# Patient Record
Sex: MALE | Race: WHITE | NOT HISPANIC OR LATINO | URBAN - METROPOLITAN AREA
[De-identification: names, ages, dates, MRNs, and addresses within clinical notes are randomized per-mention and may not be internally consistent; named-entity substitution may affect disease eponyms.]

---

## 2018-08-28 ENCOUNTER — EMERGENCY (EMERGENCY)
Facility: HOSPITAL | Age: 53
LOS: 1 days | Discharge: HOME | End: 2018-08-28
Attending: EMERGENCY MEDICINE
Payer: COMMERCIAL

## 2018-08-28 VITALS
TEMPERATURE: 98 F | HEIGHT: 71 IN | RESPIRATION RATE: 20 BRPM | WEIGHT: 225.09 LBS | DIASTOLIC BLOOD PRESSURE: 76 MMHG | SYSTOLIC BLOOD PRESSURE: 124 MMHG | HEART RATE: 102 BPM | OXYGEN SATURATION: 98 %

## 2018-08-28 VITALS
OXYGEN SATURATION: 98 % | TEMPERATURE: 99 F | HEART RATE: 73 BPM | SYSTOLIC BLOOD PRESSURE: 153 MMHG | RESPIRATION RATE: 16 BRPM | DIASTOLIC BLOOD PRESSURE: 87 MMHG

## 2018-08-28 LAB
ALBUMIN SERPL ELPH-MCNC: 4.4 G/DL — SIGNIFICANT CHANGE UP (ref 3.3–5)
ALP SERPL-CCNC: 81 U/L — SIGNIFICANT CHANGE UP (ref 40–120)
ALT FLD-CCNC: 33 U/L — SIGNIFICANT CHANGE UP (ref 10–45)
ANION GAP SERPL CALC-SCNC: 13 MMOL/L — SIGNIFICANT CHANGE UP (ref 5–17)
AST SERPL-CCNC: 23 U/L — SIGNIFICANT CHANGE UP (ref 10–40)
BASOPHILS # BLD AUTO: 0.1 K/UL — SIGNIFICANT CHANGE UP (ref 0–0.2)
BASOPHILS NFR BLD AUTO: 0.8 % — SIGNIFICANT CHANGE UP (ref 0–2)
BILIRUB SERPL-MCNC: 0.3 MG/DL — SIGNIFICANT CHANGE UP (ref 0.2–1.2)
BLOOD GAS SOURCE: SIGNIFICANT CHANGE UP
BUN SERPL-MCNC: 14 MG/DL — SIGNIFICANT CHANGE UP (ref 7–23)
CALCIUM SERPL-MCNC: 9.7 MG/DL — SIGNIFICANT CHANGE UP (ref 8.4–10.5)
CHLORIDE SERPL-SCNC: 106 MMOL/L — SIGNIFICANT CHANGE UP (ref 96–108)
CO2 SERPL-SCNC: 21 MMOL/L — LOW (ref 22–31)
COHGB MFR BLDV: 2.9 % — HIGH (ref 0–1.5)
CREAT SERPL-MCNC: 1.17 MG/DL — SIGNIFICANT CHANGE UP (ref 0.5–1.3)
EOSINOPHIL # BLD AUTO: 0.2 K/UL — SIGNIFICANT CHANGE UP (ref 0–0.5)
EOSINOPHIL NFR BLD AUTO: 1.5 % — SIGNIFICANT CHANGE UP (ref 0–6)
GLUCOSE SERPL-MCNC: 111 MG/DL — HIGH (ref 70–99)
HCT VFR BLD CALC: 48.9 % — SIGNIFICANT CHANGE UP (ref 39–50)
HGB BLD CALC-MCNC: 16.9 G/DL — SIGNIFICANT CHANGE UP (ref 13–17)
HGB BLD-MCNC: 16.8 G/DL — SIGNIFICANT CHANGE UP (ref 13–17)
LYMPHOCYTES # BLD AUTO: 19.9 % — SIGNIFICANT CHANGE UP (ref 13–44)
LYMPHOCYTES # BLD AUTO: 2.2 K/UL — SIGNIFICANT CHANGE UP (ref 1–3.3)
MCHC RBC-ENTMCNC: 28.9 PG — SIGNIFICANT CHANGE UP (ref 27–34)
MCHC RBC-ENTMCNC: 34.2 GM/DL — SIGNIFICANT CHANGE UP (ref 32–36)
MCV RBC AUTO: 84.4 FL — SIGNIFICANT CHANGE UP (ref 80–100)
MONOCYTES # BLD AUTO: 1 K/UL — HIGH (ref 0–0.9)
MONOCYTES NFR BLD AUTO: 8.6 % — SIGNIFICANT CHANGE UP (ref 2–14)
NEUTROPHILS # BLD AUTO: 7.8 K/UL — HIGH (ref 1.8–7.4)
NEUTROPHILS NFR BLD AUTO: 69.2 % — SIGNIFICANT CHANGE UP (ref 43–77)
PLATELET # BLD AUTO: 249 K/UL — SIGNIFICANT CHANGE UP (ref 150–400)
POTASSIUM SERPL-MCNC: 3.9 MMOL/L — SIGNIFICANT CHANGE UP (ref 3.5–5.3)
POTASSIUM SERPL-SCNC: 3.9 MMOL/L — SIGNIFICANT CHANGE UP (ref 3.5–5.3)
PROT SERPL-MCNC: 7.6 G/DL — SIGNIFICANT CHANGE UP (ref 6–8.3)
RBC # BLD: 5.8 M/UL — SIGNIFICANT CHANGE UP (ref 4.2–5.8)
RBC # FLD: 11.9 % — SIGNIFICANT CHANGE UP (ref 10.3–14.5)
SODIUM SERPL-SCNC: 140 MMOL/L — SIGNIFICANT CHANGE UP (ref 135–145)
WBC # BLD: 11.3 K/UL — HIGH (ref 3.8–10.5)
WBC # FLD AUTO: 11.3 K/UL — HIGH (ref 3.8–10.5)

## 2018-08-28 PROCEDURE — 82375 ASSAY CARBOXYHB QUANT: CPT

## 2018-08-28 PROCEDURE — 99284 EMERGENCY DEPT VISIT MOD MDM: CPT

## 2018-08-28 PROCEDURE — 73030 X-RAY EXAM OF SHOULDER: CPT | Mod: 26,RT

## 2018-08-28 PROCEDURE — 85027 COMPLETE CBC AUTOMATED: CPT

## 2018-08-28 PROCEDURE — 71046 X-RAY EXAM CHEST 2 VIEWS: CPT | Mod: 26

## 2018-08-28 PROCEDURE — 71046 X-RAY EXAM CHEST 2 VIEWS: CPT

## 2018-08-28 PROCEDURE — 80053 COMPREHEN METABOLIC PANEL: CPT

## 2018-08-28 PROCEDURE — 73030 X-RAY EXAM OF SHOULDER: CPT

## 2018-08-28 RX ORDER — ACETAMINOPHEN 500 MG
650 TABLET ORAL ONCE
Qty: 0 | Refills: 0 | Status: COMPLETED | OUTPATIENT
Start: 2018-08-28 | End: 2018-08-28

## 2018-08-28 RX ORDER — SODIUM CHLORIDE 9 MG/ML
1000 INJECTION INTRAMUSCULAR; INTRAVENOUS; SUBCUTANEOUS ONCE
Qty: 0 | Refills: 0 | Status: COMPLETED | OUTPATIENT
Start: 2018-08-28 | End: 2018-08-28

## 2018-08-28 RX ORDER — LOSARTAN POTASSIUM 100 MG/1
0 TABLET, FILM COATED ORAL
Qty: 0 | Refills: 0 | COMMUNITY

## 2018-08-28 RX ORDER — METOCLOPRAMIDE HCL 10 MG
10 TABLET ORAL ONCE
Qty: 0 | Refills: 0 | Status: COMPLETED | OUTPATIENT
Start: 2018-08-28 | End: 2018-08-28

## 2018-08-28 RX ADMIN — Medication 650 MILLIGRAM(S): at 18:37

## 2018-08-28 RX ADMIN — SODIUM CHLORIDE 1000 MILLILITER(S): 9 INJECTION INTRAMUSCULAR; INTRAVENOUS; SUBCUTANEOUS at 16:44

## 2018-08-28 RX ADMIN — Medication 10 MILLIGRAM(S): at 18:37

## 2018-08-28 RX ADMIN — Medication 375 MILLIGRAM(S): at 17:17
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
## 2018-08-28 NOTE — ED PROVIDER NOTE - NS ED ROS FT
Constitution: No Fever or chills  Eyes: No visual changes  HEENT: No URI symptoms  Cardio: No Chest pain  Resp: No SOB  GI: No abdominal pain  : No dysuria  MSK: +shoulder pain   Neuro: +headache   Skin: No rashes  George Rose, PGY-1

## 2018-08-28 NOTE — ED PROVIDER NOTE - PROGRESS NOTE DETAILS
BILLIE: No resp distress, labs reviewed, stable Pt feeling well and ready to go home. The patient has been informed of all concerning signs and symptoms to return to Emergency Department, the necessity to follow up with PMD/Clinic/follow up provided within 2-3 days was explained, and the patient reports understanding of above with capacity and insight.
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
## 2018-08-28 NOTE — ED ADULT NURSE REASSESSMENT NOTE - NS ED NURSE REASSESS COMMENT FT1
pt for discharge with headache remaining md notified with tylenol and reglan given as ordered pt pending reevaluation of headache for discharge wife at bedside
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
## 2019-09-02 ENCOUNTER — EMERGENCY (EMERGENCY)
Facility: HOSPITAL | Age: 54
LOS: 1 days | Discharge: HOME | End: 2019-09-02
Attending: EMERGENCY MEDICINE
Payer: COMMERCIAL

## 2019-09-02 VITALS
WEIGHT: 244.93 LBS | OXYGEN SATURATION: 97 % | TEMPERATURE: 98 F | DIASTOLIC BLOOD PRESSURE: 87 MMHG | HEART RATE: 66 BPM | SYSTOLIC BLOOD PRESSURE: 140 MMHG | RESPIRATION RATE: 18 BRPM

## 2019-09-02 PROBLEM — I10 ESSENTIAL (PRIMARY) HYPERTENSION: Chronic | Status: ACTIVE | Noted: 2018-08-28

## 2019-09-02 PROCEDURE — 73562 X-RAY EXAM OF KNEE 3: CPT | Mod: 26,RT

## 2019-09-02 PROCEDURE — 99284 EMERGENCY DEPT VISIT MOD MDM: CPT

## 2019-09-02 PROCEDURE — 99283 EMERGENCY DEPT VISIT LOW MDM: CPT

## 2019-09-02 PROCEDURE — 73562 X-RAY EXAM OF KNEE 3: CPT

## 2019-09-02 RX ORDER — ACETAMINOPHEN 500 MG
975 TABLET ORAL ONCE
Refills: 0 | Status: COMPLETED | OUTPATIENT
Start: 2019-09-02 | End: 2019-09-02

## 2019-09-02 RX ADMIN — Medication 975 MILLIGRAM(S): at 12:30

## 2019-09-02 RX ADMIN — Medication 975 MILLIGRAM(S): at 11:07

## 2019-09-02 NOTE — ED PROVIDER NOTE - CARE PLAN
Principal Discharge DX:	Knee strain, right, initial encounter Principal Discharge DX:	MCL sprain of right knee

## 2019-09-02 NOTE — ED PROVIDER NOTE - NSFOLLOWUPINSTRUCTIONS_ED_ALL_ED_FT
Follow up with Dr. Ramirez (orthopedic surgery) in 5-7 days.  You will likely need an MRI for this injury.  Motrin 600 mg every 8 hours   Tylenol 500 mg every 6 hours  Keep your knee wrap on and in place.  Keep it dry.  Use crutches as needed. Follow up with Dr. Ramirez (orthopedic surgery) in 5-7 days.    Motrin 600 mg every 8 hours   Tylenol 500 mg every 6 hours  Keep your knee wrap on and in place.  Keep it dry.  Use crutches as needed.  Your injury seems consistent with an MCL injury.  You will likely require an MRI

## 2019-09-02 NOTE — ED ADULT NURSE NOTE - NSIMPLEMENTINTERV_GEN_ALL_ED
Implemented All Universal Safety Interventions:  [place] to call system. Call bell, personal items and telephone within reach. Instruct patient to call for assistance. Room bathroom lighting operational. Non-slip footwear when patient is off stretcher. Physically safe environment: no spills, clutter or unnecessary equipment. Stretcher in lowest position, wheels locked, appropriate side rails in place.

## 2019-09-02 NOTE — ED PROVIDER NOTE - CLINICAL SUMMARY MEDICAL DECISION MAKING FREE TEXT BOX
R knee injury after fall from 2-3 feet, no e/o lower back/spine injury, no concern for emergent pathology (compartment syndrome, knee dislocation, etc).  Exam somewhat concerning for plateau fx.  May represent MCL injury.  XRs, pain Rx, low threshold to obtain CT.  --BMM

## 2019-09-02 NOTE — ED PROVIDER NOTE - PATIENT PORTAL LINK FT
You can access the FollowMyHealth Patient Portal offered by [facility] by registering at the following website: [email or web address]. By joining [email or web address]’s FollowMyHealth portal, you will also be able to view your health information using other applications (apps) compatible with our system.
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
## 2019-09-02 NOTE — ED PROVIDER NOTE - PHYSICAL EXAMINATION
GENERAL: AAOx4, GCS 15, NAD, WDWN; HEENT: MMM, no jugular venous distension, supple neck, PERRLA, EOMI, nonicteric sclera; PULM: CTA B, no crackles/rubs/rales; CV: RRR, S1S2, no MRG; ABD: Flat abdomen, NTND, no R/G/R, no CVAT.  MSK: R knee c normal skin, minimal effusion, tenderness to palpation at medial tib plateau without deformity or stepoff, extension lag (~20 degrees) and reduced flexion, +pain c valgus stress, thissely negative b/l, neg ant/post drawers, compartments soft distally, 5/5 strength throughout RLE.  JO, +2 pulses x4;  NEURO: Subjective diminished sensation to soft touch on plantar aspect of 1-4 toes, none otherwise; no obvious focal deficits otherwise; PSYCH: AAOx3, clear thought and normal sensorium.

## 2020-06-11 NOTE — ED ADULT NURSE NOTE - NS ED NOTE ABUSE SUSPICION NEGLECT YN
No
Detail Level: Simple
Additional Notes: Patient consent was obtained to proceed with the visit and recommended plan of care after discussion of all risks and benefits, including the risks of COVID-19 exposure.

## 2021-03-09 ENCOUNTER — EMERGENCY (EMERGENCY)
Facility: HOSPITAL | Age: 56
LOS: 1 days | Discharge: HOME | End: 2021-03-09
Attending: EMERGENCY MEDICINE
Payer: SELF-PAY

## 2021-03-09 VITALS
HEIGHT: 70 IN | WEIGHT: 240.08 LBS | OXYGEN SATURATION: 96 % | RESPIRATION RATE: 20 BRPM | TEMPERATURE: 98 F | HEART RATE: 94 BPM | DIASTOLIC BLOOD PRESSURE: 81 MMHG | SYSTOLIC BLOOD PRESSURE: 165 MMHG

## 2021-03-09 VITALS
TEMPERATURE: 98 F | HEART RATE: 69 BPM | SYSTOLIC BLOOD PRESSURE: 139 MMHG | OXYGEN SATURATION: 99 % | RESPIRATION RATE: 18 BRPM | DIASTOLIC BLOOD PRESSURE: 89 MMHG

## 2021-03-09 DIAGNOSIS — Z98.890 OTHER SPECIFIED POSTPROCEDURAL STATES: Chronic | ICD-10-CM

## 2021-03-09 PROCEDURE — 73564 X-RAY EXAM KNEE 4 OR MORE: CPT

## 2021-03-09 PROCEDURE — 73552 X-RAY EXAM OF FEMUR 2/>: CPT

## 2021-03-09 PROCEDURE — 73552 X-RAY EXAM OF FEMUR 2/>: CPT | Mod: 26,RT

## 2021-03-09 PROCEDURE — 73502 X-RAY EXAM HIP UNI 2-3 VIEWS: CPT | Mod: 26,RT

## 2021-03-09 PROCEDURE — 73590 X-RAY EXAM OF LOWER LEG: CPT

## 2021-03-09 PROCEDURE — 73590 X-RAY EXAM OF LOWER LEG: CPT | Mod: 26,RT

## 2021-03-09 PROCEDURE — 73564 X-RAY EXAM KNEE 4 OR MORE: CPT | Mod: 26,RT

## 2021-03-09 PROCEDURE — 99284 EMERGENCY DEPT VISIT MOD MDM: CPT

## 2021-03-09 PROCEDURE — 73502 X-RAY EXAM HIP UNI 2-3 VIEWS: CPT

## 2021-03-09 PROCEDURE — 99284 EMERGENCY DEPT VISIT MOD MDM: CPT | Mod: 25

## 2021-03-09 RX ORDER — ACETAMINOPHEN 500 MG
975 TABLET ORAL ONCE
Refills: 0 | Status: COMPLETED | OUTPATIENT
Start: 2021-03-09 | End: 2021-03-09

## 2021-03-09 RX ADMIN — Medication 975 MILLIGRAM(S): at 17:45

## 2021-03-09 RX ADMIN — Medication 975 MILLIGRAM(S): at 16:59

## 2021-03-09 NOTE — ED PROVIDER NOTE - CARE PLAN
Principal Discharge DX:	Acute pain of right knee   Principal Discharge DX:	Thigh pain, musculoskeletal, right  Secondary Diagnosis:	Acute pain of right knee

## 2021-03-09 NOTE — ED PROVIDER NOTE - PATIENT PORTAL LINK FT
You can access the FollowMyHealth Patient Portal offered by [facility] by registering at the following website: [email or web address]. By joining [email or web address]’s FollowMyHealth portal, you will also be able to view your health information using other applications (apps) compatible with our system.
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
## 2021-03-09 NOTE — ED PROVIDER NOTE - NS ED ROS FT
GENERAL: no fever, chills  HEENT: no cough, congestion, odynophagia, dysphagia  CARDIAC: no chest pain, palpitations, lightheadedness  PULM: no dyspnea, wheezing   GI: no abdominal pain, nausea, vomiting, diarrhea, constipation, melena, hematochezia  : no urinary dysuria, frequency, incontinence, hematuria  NEURO: no headache, motor weakness, sensory changes  MSK: (+) R knee pain  SKIN: no rashes, hives  HEME: no active bleeding, bruising

## 2021-03-09 NOTE — ED PROVIDER NOTE - NSFOLLOWUPINSTRUCTIONS_ED_ALL_ED_FT
-You were seen in the Emergency Department (ED) for Knee/Thigh Pain. Lab and imaging results, if performed, were discussed with you along with your discharge diagnosis.    -Please follow up with the Sports Medicine Clinic regarding your Knee/Thigh Pain within the next 7 days. A number has been provided for your convenience.     PAIN CONTROL:  -Please take over the counter Tylenol (also known as acetaminophen) 650mg every 6 hours or Ibuprofen (also known as motrin, advil) 600mg every 8 hour for your pain, IF ANY, unless you are not supposed to for any reason.  -Rest, stay hydrated with plenty of fluids (drink at least 2 Liters or 64 Ounces of water each day UNLESS you are supposed to restrict fluids or ANY reason.      RETURN PRECAUTIONS:  -Please return to the Emergency Department if you experience ANY new or concerning symptoms, such as, but not limited to: worsening pain, large amount of bleeding, passing out, fever >100.F, shaking chills, inability to see or new double vision, chest pain, difficulty breathing, diffuse abdominal pain, unable to eat or drink, continuous vomiting or diarrhea, unable to move or feel part of your body - You were seen in the Emergency Department (ED) for Knee/Thigh Pain. The results of your X-rays were discussed with you.  The results of your x-rays were non actionable.  We suspect that you have an intramuscular hematoma of the RIGHT thigh.      - We called the office of Dr. Joann Sams who performed surgery of your RIGHT knee and Alison reports that you must be referred back to Dr. Sams.    -Please follow up with your PMD or your orthopedist or the Sports Medicine Clinic regarding your Knee/Thigh Pain within the next 7 days. A number has been provided for your convenience.     PAIN CONTROL:  -Please take over the counter pain control such as Tylenol (also known as acetaminophen) as per package instructions.    -Rest, stay hydrated with plenty of fluids (drink at least 2 Liters or 64 Ounces of water each day UNLESS you are supposed to restrict fluids or ANY reason.      RETURN PRECAUTIONS:  -Please return to the Emergency Department if you experience ANY new or concerning symptoms, such as, but not limited to: worsening pain, difficulty moving your leg, new chest pain, shortness of breath, abdominal pain, blood in urine or in your stools or any other concerns or new symptoms.
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
## 2021-03-09 NOTE — ED PROVIDER NOTE - NSFOLLOWUPCLINICS_GEN_ALL_ED_FT
[facility]  Sports Medicine  [address] [place] [address]  Phone: [phone]  Fax:   Follow Up Time: 7-10 Days
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
## 2021-03-09 NOTE — ED ADULT NURSE REASSESSMENT NOTE - NS ED NURSE REASSESS COMMENT FT1
No fx noted on XR. As per Candice WHITTEN, pt cleared for discharge. Pt discharged home to follow up with PMD/Sports Medicine. VSS No fx noted on XR. As per Candice WHITTEN, pt cleared for discharge. Pt discharged home to follow up with PMD/Sports Medicine. VSS. Pt ambulate to discharge area with steady gait.
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
## 2021-03-09 NOTE — ED PROVIDER NOTE - PROGRESS NOTE DETAILS
Attending MD Harvey: Spoke with Alison from Dr. Joann Sams's office, reports that patient was referred via [facility] and to return would need approval from [facility] medical office.  Will advise patient of this. Patient reassessed at this time, VSS. no clinical change from initial assessment. xray results relayed to patient. will have patient follow up as outpatient.

## 2021-03-09 NOTE — ED ADULT NURSE NOTE - ED STAT RN HANDOFF DETAILS
Break RN: Patient seen by ERP.  Blood drawn and sent to lab.  Medicated per MAR for pain.  US at bedside at this time.    
PT DISCHARGED HOME, SKIN PWD, GAIT STEADY, A AND X O 3, IN NAD.  PT VERBALIZED UNDERSTANDING OF DISCHARGE INSTRUCTIONS, PRESCRIPTIONS GIVEN.      
Pt resting comfortably, awaiting dispo.    
report given to ED EDUARDO Morrow

## 2021-03-10 NOTE — ED POST DISCHARGE NOTE - ADDITIONAL DOCUMENTATION
3/14: spoke with patient, following with ortho, scheduled for MRI, faxed report to his orthopedic physician at his request - Flaquita Castillo PA-C
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
## 2021-03-10 NOTE — ED POST DISCHARGE NOTE - RESULT SUMMARY
3/10/21: X-Ray R knee with 1.0 cm intra-articular ossific body at the knee joint posterior medially, not mentioned in prelim report. Called reading room for clarification and directed to [phone], then again directed to Attg Radiologist Richard at [phone] left  for call back @11:50. - Adilene Godwin PA-C

## 2021-07-12 NOTE — ED ADULT TRIAGE NOTE - HEART RATE (BEATS/MIN)
[FreeTextEntry1] : Followup of MDD/OCD symptoms, and medication refill. [de-identified] : Patient presents for followup of chronic medical conditions-MDD, ADHD, bronchial asthma, allergic rhinitis, GERD-and prescription refills. Reports having increased symptoms of anxiety and continued terminal insomnia. Ann symptoms appear controlled on present dose of lamotrigine-150 mg/day. Wishes to discuss options. Continues to lose weight off of olanzapine. Otherwise, feels well in general. No other new symptoms. Tolerating all prescription medications. 102

## 2022-09-02 ENCOUNTER — EMERGENCY (EMERGENCY)
Facility: HOSPITAL | Age: 57
LOS: 1 days | Discharge: HOME | End: 2022-09-02
Attending: EMERGENCY MEDICINE
Payer: COMMERCIAL

## 2022-09-02 VITALS
OXYGEN SATURATION: 97 % | RESPIRATION RATE: 20 BRPM | SYSTOLIC BLOOD PRESSURE: 136 MMHG | WEIGHT: 250 LBS | HEART RATE: 85 BPM | DIASTOLIC BLOOD PRESSURE: 89 MMHG | HEIGHT: 70 IN | TEMPERATURE: 99 F

## 2022-09-02 DIAGNOSIS — Z98.890 OTHER SPECIFIED POSTPROCEDURAL STATES: Chronic | ICD-10-CM

## 2022-09-02 PROCEDURE — 99053 MED SERV 10PM-8AM 24 HR FAC: CPT

## 2022-09-02 PROCEDURE — 73564 X-RAY EXAM KNEE 4 OR MORE: CPT | Mod: 26,RT

## 2022-09-02 PROCEDURE — 99284 EMERGENCY DEPT VISIT MOD MDM: CPT

## 2022-09-02 PROCEDURE — 73564 X-RAY EXAM KNEE 4 OR MORE: CPT

## 2022-09-02 PROCEDURE — 99283 EMERGENCY DEPT VISIT LOW MDM: CPT | Mod: 25

## 2022-09-02 RX ORDER — ACETAMINOPHEN 500 MG
650 TABLET ORAL ONCE
Refills: 0 | Status: COMPLETED | OUTPATIENT
Start: 2022-09-02 | End: 2022-09-02

## 2022-09-02 RX ADMIN — Medication 650 MILLIGRAM(S): at 03:45

## 2022-09-02 NOTE — ED PROVIDER NOTE - NSFOLLOWUPINSTRUCTIONS_ED_ALL_ED_FT
SOFT TISSUE INJURY OF THE KNEE (LIGAMENT VS MENISCUS)    •An anterior cruciate ligament (ACL) injury is a partial or complete tear of the ACL. The ACL is a ligament in your knee that connects the tibia (shin bone) to the femur (thigh bone). Ligaments are strong tissues that connect bones together. The ACL stops the tibia from sliding too far forward and keeps the knee stable.  A meniscus tear is a tear in the cartilage of your knee.   •The meniscus is a piece of cartilage (strong tissue) between your thighbone and shinbone. The meniscus helps to cushion your knee joint and keep it stable.    Common symptoms include the following:   •A pop, snap, or tear when your ACL or meniscus is injured  •Sudden swelling or pain in your knee  •The knee gives way  •A change in the way you walk, such as with stiff legs  •Trouble putting weight on your leg or straightening the knee    Treatment of this injury includes:  •NSAIDs, such as ibuprofen, help decrease swelling, pain, and fever. This medicine is available with or without a doctor's order. NSAIDs can cause stomach bleeding or kidney problems in certain people. If you take blood thinner medicine, always ask if NSAIDs are safe for you. Always read the medicine label and follow directions. Do not give these medicines to children under 6 months of age without direction from your child's healthcare provider.  •Rest your knee. Avoid activities that make the swelling or pain worse. You may need to avoid putting weight on your leg while you have pain. Your healthcare provider may recommend that you use crutches.  •Apply ice on your knee for 15 to 20 minutes every hour or as directed. Use an ice pack, or put crushed ice in a plastic bag. Cover it with a towel. Ice helps prevent tissue damage and decreases swelling and pain.  •Compress your knee with an elastic bandage, air cast, medical boot, or splint to reduce swelling. Ask your healthcare provider which compression device to use, and how tight it should be.  •Elevate your knee above the level of your heart as often as you can. This will help decrease swelling and pain. Prop your knee on pillows or blankets to keep it elevated comfortably.   •MRI &/or Surgery may be necessary if conservative measures (rest/immobilization) fail, or you were to have persistent pain & swelling.  Surgery may also be necessary if you had a high level of pre-injury function []).    Seek care immediately if:   •Your toes are cold or numb.  •Your knee becomes more weak or unstable.  •Your pain has increased, even after you take your pain medicine.  •Your swelling has increased.    Follow up with Orthopedic surgery &/or Sports Medicine this week (can call number below).  Rest, ice and elevate knee.    Ambulate as tolerate with use of crutches.    Take Motrin 600mg every 8 hrs for pain with food.  RETURN TO THE EMERGENCY DEPARTMENT IF YOU EXPERIENCE WORSENING PAIN, OR ANY OTHER CONCERNS.    [facility]  Sports Medicine  [address] [place] [address]  Phone: [phone]  Follow Up Time: 4-6 Days

## 2022-09-02 NOTE — ED PROVIDER NOTE - PHYSICAL EXAMINATION
Constitutional: Alert and orientedx3, well appearing, no apparent distress  HEENT: Atraumatic, PERRL, throat nonerythematous, no lesions  CV: RRR, S1, S2, no murmurs  Lungs: Clear and equal bilaterally, no wheezes, rales or crackles  Abdomen: Soft, nondistended, nontender  MSK:  Normal ROM in all other extremities, no deformities or erythema  Skin: Warm and dry. No rashes, lesions, bruising or erythema  Neuro: CN II-XII intact, no focal neurological deficits, sensation and motor intact in all extremities  Lymph: No pitting edema in extremities. Constitutional: Alert and orientedx3, well appearing, no apparent distress  HEENT: Atraumatic, PERRL, throat nonerythematous, no lesions  CV: RRR, S1, S2, no murmurs  Lungs: Clear and equal bilaterally, no wheezes, rales or crackles  Abdomen: Soft, nondistended, nontender  MSK: Right knee: Mild swelling. No bruising, TTP of b/l sides of patella. Pain with movement and compression of patella. Decreased flexion due to pain and pressure in joint. No pain with varus or valgus. Negative anterior drawer. NV intact, 2/2 DP pulses b/l      Normal ROM in all other extremities, no deformities or erythema  Skin: Warm and dry. No rashes, lesions, bruising or erythema  Neuro: CN II-XII intact, no focal neurological deficits, sensation and motor intact in all extremities  Lymph: No pitting edema in extremities.

## 2022-09-02 NOTE — ED ADULT NURSE NOTE - NSIMPLEMENTINTERV_GEN_ALL_ED
Implemented All Fall Risk Interventions:  [place] to call system. Call bell, personal items and telephone within reach. Instruct patient to call for assistance. Room bathroom lighting operational. Non-slip footwear when patient is off stretcher. Physically safe environment: no spills, clutter or unnecessary equipment. Stretcher in lowest position, wheels locked, appropriate side rails in place. Provide visual cue, wrist band, yellow gown, etc. Monitor gait and stability. Monitor for mental status changes and reorient to person, place, and time. Review medications for side effects contributing to fall risk. Reinforce activity limits and safety measures with patient and family.

## 2022-09-02 NOTE — ED PROVIDER NOTE - ATTENDING CONTRIBUTION TO CARE
xray neg. pt ambulating, NV intact. Patient is safe for d/c with supportive care, return precautions, and outpt f/u as needed.
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
## 2022-09-02 NOTE — ED PROVIDER NOTE - PATIENT PORTAL LINK FT
You can access the FollowMyHealth Patient Portal offered by [facility] by registering at the following website: [email or web address]. By joining [facility]’s FollowMyHealth portal, you will also be able to view your health information using other applications (apps) compatible with our system.

## 2022-09-02 NOTE — ED ADULT TRIAGE NOTE - CHIEF COMPLAINT QUOTE
pt [employer], c/o right knee pain s/p sliding down ladder and hitting right knee on ladder. Denies head trauma/LOC.

## 2022-09-02 NOTE — ED ADULT NURSE NOTE - OBJECTIVE STATEMENT
55 yo M pt PSHx of R sided meniscus repair BIBEMS for R sided knee pain s/p fall down ladder while working for [employer]. pt endorses twisting his R knee and pain is just posterior to R knee cap worsens with extension. pt has tried nothing for relief.  pt is A&Ox4, JO, no obvious deformity, PMS intact, ROM limited d/t pain.  Pt denies: head trauma or pain/trauma to any other body parts, headache, dizziness, chest pain, palpitations, cough, SOB, abdominal pain, n/v/d, urinary symptoms, fevers, chills, weakness at this time.

## 2022-09-02 NOTE — ED PROVIDER NOTE - OBJECTIVE STATEMENT
57 y/o M 57 y/o M with PMHx of HTN and HLD presents to the ED for right knee injury. Pt works for [employer] and was going down ladder when he missed the last 3 rungs of ladder and twisted his right knee. Pt reports hearing 'pop'. Pt states that he had knee surgery 3 years ago for meniscus and cartilage repair in the R knee. Pt had cartilage injury in same knee 1.5 years ago. Pt states that pain feels similar to previous injuries. Pt has some numbness in 1s, 2nd and 3rd digits on right foot. Pt was able to ambulate after fall but feels unstable. Pt denies fevers, chills, n/v/d/c, SOB, abdominal pain.

## 2022-11-19 NOTE — ED PROVIDER NOTE - PROGRESS NOTE DETAILS
made aware by covering nurse Mayda pt wants to leave refused to sign ama IV removed and left states will go to [facility] Patricia Laurent PGY1: Reevaluated pain, better with Tylenol. No acute fx on prelim results. Dispo home with ace compression wrap and ortho f/u.

## 2022-11-22 NOTE — ED ADULT NURSE NOTE - TEMPLATE
Nerve Block    Date/Time: 11/22/2022 8:02 AM  Performed by: Rio Soto DO  Authorized by: Rio Soto DO     Block Type:interscalene  Laterality:  Right  Indication: post-op pain management at surgeon's request    Surgeon:  Lukasz  Preanesthetic Checklist Patient Identified (2 criteria), Block Plan Confirmed, Resuscitation Equipment Available, Supplemental O2 (if needed), Allergies Confirmed, Block Site Marked (if applicable), Monitors Applied, Aseptic Technique, Coagulation Status, Necessary Block Equipment Present, Timeout Performed, IV Access Functioning, Consent Verified, Drugs/Solutions Labeled and Sedation Given (if needed)    Patient Position:  Supine  Prep:  Chlorhexidine gluconate (CHG)  Max Sterile Barrier Technique:  Hand washing, cap/mask, sterile gloves, sterile towel drapes, sterile gel and sterile probe cover  Monitoring:  Continuous pulse oximetry, blood pressure and EKG  Injection Technique:  Single-shot  Procedures: ultrasound guided and ultrasound permanent image saved    Needle Type:  Short-bevel  Needle Gauge:  21 G  Needle Length:  5 cm  Needle Localization:  Ultrasound guidance  Physical status during block:  Awake  Injection Assessment:  Negative aspiration for heme, no resistance to injection, no paresthesia on injection and local visualized surrounding nerve on ultrasound  Patient Condition:  Tolerated well, no immediate complications  Paresthesia Pain:  None  Heart Rate Change: No    Slowly Injected: Yes    Performed By:  Anesthesiologist  Anesthesiologist:  Rio Soto DO  Start Time:11/22/2022 8:02 AM  Stop Time: 11/22/2022 8:08 AM     Orthopedic
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
## 2024-05-01 NOTE — ED ADULT TRIAGE NOTE - BSA (M2)
Sent fax request to [facility], Poly Capone, at fax # [phone] for any and all cardiac related records.    Fax confirmation has been received and sent to scan through [facility].     NO FENG Called [facility] Medical Records after receiving a fax stating there is no patient there by this name and was told over the phone the same information.    Return fax scanned into [facility].   2.22

## 2025-02-25 NOTE — ED ADULT TRIAGE NOTE - MODE OF ARRIVAL
Ambulance EMS Quality 226: Preventive Care And Screening: Tobacco Use: Screening And Cessation Intervention: Patient screened for tobacco use and is an ex/non-smoker Detail Level: Detailed Quality 431: Preventive Care And Screening: Unhealthy Alcohol Use - Screening: Patient not identified as an unhealthy alcohol user when screened for unhealthy alcohol use using a systematic screening method
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
